# Patient Record
Sex: MALE | Race: BLACK OR AFRICAN AMERICAN | Employment: FULL TIME | URBAN - METROPOLITAN AREA
[De-identification: names, ages, dates, MRNs, and addresses within clinical notes are randomized per-mention and may not be internally consistent; named-entity substitution may affect disease eponyms.]

---

## 2022-03-24 ENCOUNTER — HOSPITAL ENCOUNTER (EMERGENCY)
Age: 32
Discharge: HOME | End: 2022-03-24
Attending: EMERGENCY MEDICINE

## 2022-03-24 VITALS
HEART RATE: 78 BPM | TEMPERATURE: 97 F | SYSTOLIC BLOOD PRESSURE: 137 MMHG | DIASTOLIC BLOOD PRESSURE: 78 MMHG | OXYGEN SATURATION: 100 % | WEIGHT: 278.66 LBS | HEIGHT: 68 IN | RESPIRATION RATE: 16 BRPM | BODY MASS INDEX: 42.23 KG/M2

## 2022-03-24 DIAGNOSIS — J41.0 SMOKERS' COUGH (HCC): ICD-10-CM

## 2022-03-24 DIAGNOSIS — R55 VASOVAGAL EPISODE: Primary | ICD-10-CM

## 2022-03-24 PROCEDURE — 99283 EMERGENCY DEPT VISIT LOW MDM: CPT

## 2022-03-24 PROCEDURE — 93005 ELECTROCARDIOGRAM TRACING: CPT

## 2022-03-24 NOTE — ED TRIAGE NOTES
I was at [facility] and they told me I passed out and woke up confused and sweating and vomiting, perfuse I went to the clinic and they did a ekg and blood work. I have appointment for pcp. This was at [facility] with pericarditis. I would like a second opinion.  I am having abdominal pain from vomiting. endorses smokes marijuana daily

## 2022-03-27 NOTE — ED PROVIDER NOTES
EMERGENCY DEPARTMENT HISTORY AND PHYSICAL EXAM    Date: 3/24/2022  Patient Name: Singh Overton    History of Presenting Illness     Chief Complaint   Patient presents with    Syncope         History Provided By: Patient    Additional History (Context):   1:21 PM  Singh Overton is a 32 y.o. male with PMHX of pericarditis, costochondritis who presents to the emergency department C/O fainting spell. Patient was seen at the urgent care center just 2 days ago for vomiting's. He also had some discomfort lying back and difficulty swallowing. They diagnosed the 11th vomiting but pericarditis according to his EKG reading. Today he was sitting in a rahman chair and had a episode of weakness and vasovagal episode. This occurred at home after smoking marijuana which she does almost daily. She denies any diarrhea stated tolerates eating and drinking relatively easily. Social History  Denies alcohol tobacco or illegal drugs. He does smoke marijuana frequently    Family History  Negative for cardiomyopathy or enlarged heart. Negative for premature heart disease or blood clots disorders      PCP: None    Current Outpatient Medications   Medication Sig Dispense Refill    HYDROcodone-acetaminophen (NORCO) 5-325 mg per tablet Take 1 Tab by mouth every four (4) hours as needed for Pain. Max Daily Amount: 6 Tabs. 20 Tab 0       Past History     Past Medical History:  History reviewed. No pertinent past medical history. Past Surgical History:  History reviewed. No pertinent surgical history. Family History:  History reviewed. No pertinent family history. Social History:  Social History     Tobacco Use    Smoking status: Never Smoker    Smokeless tobacco: Not on file   Substance Use Topics    Alcohol use: Yes    Drug use: Yes     Types: Marijuana     Comment: smokes every day       Allergies:  No Known Allergies      Review of Systems   Review of Systems   Gastrointestinal: Positive for vomiting.    Neurological: Positive for syncope. All other systems reviewed and are negative. Physical Exam     Vitals:    03/24/22 1256   BP: 137/78   Pulse: 78   Resp: 16   Temp: 97 °F (36.1 °C)   SpO2: 100%   Weight: 126.4 kg (278 lb 10.6 oz)   Height: 5' 8\" (1.727 m)     Physical Exam  Vitals and nursing note reviewed. Constitutional:       General: He is not in acute distress. Appearance: He is well-developed. He is not diaphoretic. HENT:      Head: Normocephalic and atraumatic. Eyes:      General: No scleral icterus. Extraocular Movements:      Right eye: Normal extraocular motion. Left eye: Normal extraocular motion. Conjunctiva/sclera: Conjunctivae normal.      Pupils: Pupils are equal, round, and reactive to light. Neck:      Trachea: No tracheal deviation. Cardiovascular:      Rate and Rhythm: Normal rate and regular rhythm. Heart sounds: Normal heart sounds. Comments: No audible friction rub  Pulmonary:      Effort: Pulmonary effort is normal. No respiratory distress. Breath sounds: Normal breath sounds. No stridor. Abdominal:      General: Bowel sounds are normal. There is no distension. Palpations: Abdomen is soft. Tenderness: There is no abdominal tenderness. There is no rebound. Musculoskeletal:         General: No tenderness. Normal range of motion. Cervical back: Normal range of motion and neck supple. Comments: Grossly unremarkable without abnormalities   Skin:     General: Skin is warm and dry. Capillary Refill: Capillary refill takes less than 2 seconds. Findings: No erythema or rash. Neurological:      Mental Status: He is alert and oriented to person, place, and time. GCS: GCS eye subscore is 4. GCS verbal subscore is 5. GCS motor subscore is 6. Cranial Nerves: No cranial nerve deficit. Sensory: Sensation is intact. Motor: Motor function is intact. No weakness, tremor, atrophy, abnormal muscle tone or seizure activity. Coordination: Coordination is intact. Coordination normal. Finger-Nose-Finger Test and Heel to [place] romain Test normal.      Gait: Gait normal.   Psychiatric:         Mood and Affect: Mood normal.         Behavior: Behavior normal.         Thought Content: Thought content normal.         Judgment: Judgment normal.       Diagnostic Study Results     Labs -  No results found for this or any previous visit (from the past 24 hour(s)). Radiologic Studies -   No orders to display     CT Results  (Last 48 hours)    None        CXR Results  (Last 48 hours)    None          Medications given in the ED-  Medications - No data to display      Medical Decision Making   I am the first provider for this patient. I reviewed the vital signs, available nursing notes, past medical history, past surgical history, family history and social history. Vital Signs-Reviewed the patient's vital signs. Pulse Oximetry Analysis - 100% on room air    Cardiac Monitor:  Rate: 75 bpm  Rhythm: Sinus rhythm    EKG interpretation: (Preliminary)  1:05 PM    Normal sinus rhythm, rate 64, normal ST segments, there is early repolarization, QTC is 379. EKG read by Cathleen Yanez MD      Records Reviewed: NURSING NOTES AND PREVIOUS MEDICAL RECORDS    Provider Notes (Medical Decision Making):   Patient describes vomiting spell with vasovagal episode. His EKG today looks to have early repole and I looked at his previous one the other day which did show evidence of diffuse ST segment elevation to nearly all precordial and extremity leads his troponins them were normal.  EKG today shows no evidence of ST segment elevation although there is early repole.   I explained to him the mechanisms of pericarditis and the treatment and addition to her marijuana usage along with breath withholding and swallowing to prevent coughing spells markedly increased chest x-ray thoracic pressure will likely contribute to fainting spells vasovagal episode which he acknowledged is contributing factors during his episodes. Cardiac monitor and EKG were unremarkable. After explaining mechanisms but the patient and I do not feel he needs further work-up. There is no audible friction rub on examination. Patient can be released with instructions for follow-up as needed. Procedures:  Procedures    ED Course:   1:21 PM: Initial assessment performed. The patients presenting problems have been discussed, and they are in agreement with the care plan formulated and outlined with them. I have encouraged them to ask questions as they arise throughout their visit. Diagnosis and Disposition       DISCHARGE NOTE:  1:53 PM  Goran Toure's  results have been reviewed with him. He has been counseled regarding his diagnosis, treatment, and plan. He verbally conveys understanding and agreement of the signs, symptoms, diagnosis, treatment and prognosis and additionally agrees to follow up as discussed. He also agrees with the care-plan and conveys that all of his questions have been answered. I have also provided discharge instructions for him that include: educational information regarding their diagnosis and treatment, and list of reasons why they would want to return to the ED prior to their follow-up appointment, should his condition change. He has been provided with education for proper emergency department utilization. CLINICAL IMPRESSION:    1. Vasovagal episode    2. Smokers' cough ([address]        PLAN:  1. D/C Home  2. Discharge Medication List as of 3/24/2022  2:24 PM        3. Follow-up Information    None       _______________________________    This note was partially transcribed via voice recognition software. Although efforts have been made to catch any discrepancies, it may contain sound alike words, grammatical errors, or nonsensical words.

## 2022-03-29 LAB
ATRIAL RATE: 64 BPM
CALCULATED P AXIS, ECG09: 43 DEGREES
CALCULATED R AXIS, ECG10: 19 DEGREES
CALCULATED T AXIS, ECG11: 17 DEGREES
DIAGNOSIS, 93000: NORMAL
P-R INTERVAL, ECG05: 140 MS
Q-T INTERVAL, ECG07: 368 MS
QRS DURATION, ECG06: 76 MS
QTC CALCULATION (BEZET), ECG08: 379 MS
VENTRICULAR RATE, ECG03: 64 BPM